# Patient Record
Sex: FEMALE | Race: WHITE | NOT HISPANIC OR LATINO | Employment: UNEMPLOYED | URBAN - METROPOLITAN AREA
[De-identification: names, ages, dates, MRNs, and addresses within clinical notes are randomized per-mention and may not be internally consistent; named-entity substitution may affect disease eponyms.]

---

## 2021-01-28 ENCOUNTER — OFFICE VISIT (OUTPATIENT)
Dept: INTERNAL MEDICINE | Facility: CLINIC | Age: 56
End: 2021-01-28

## 2021-01-28 VITALS
SYSTOLIC BLOOD PRESSURE: 121 MMHG | DIASTOLIC BLOOD PRESSURE: 73 MMHG | HEIGHT: 64 IN | BODY MASS INDEX: 28.24 KG/M2 | WEIGHT: 165.4 LBS | TEMPERATURE: 96.9 F | HEART RATE: 70 BPM

## 2021-01-28 DIAGNOSIS — Z86.73 HISTORY OF TIA (TRANSIENT ISCHEMIC ATTACK): ICD-10-CM

## 2021-01-28 DIAGNOSIS — L71.9 ROSACEA: ICD-10-CM

## 2021-01-28 DIAGNOSIS — N95.1 MENOPAUSAL SYMPTOMS: ICD-10-CM

## 2021-01-28 DIAGNOSIS — M19.079 PRIMARY OSTEOARTHRITIS OF FOOT, UNSPECIFIED LATERALITY: ICD-10-CM

## 2021-01-28 DIAGNOSIS — E03.9 ACQUIRED HYPOTHYROIDISM: Primary | ICD-10-CM

## 2021-01-28 DIAGNOSIS — Z13.21 ENCOUNTER FOR VITAMIN DEFICIENCY SCREENING: ICD-10-CM

## 2021-01-28 PROBLEM — J30.89 NON-SEASONAL ALLERGIC RHINITIS: Status: ACTIVE | Noted: 2021-01-28

## 2021-01-28 PROCEDURE — 99204 OFFICE O/P NEW MOD 45 MIN: CPT | Performed by: PHYSICIAN ASSISTANT

## 2021-01-28 RX ORDER — MELOXICAM 7.5 MG/1
7.5 TABLET ORAL AS NEEDED
COMMUNITY
Start: 2021-01-27

## 2021-01-28 RX ORDER — LEVOTHYROXINE SODIUM 0.07 MG/1
75 TABLET ORAL DAILY
COMMUNITY
Start: 2020-11-11

## 2021-01-28 RX ORDER — ASPIRIN 81 MG/1
81 TABLET ORAL DAILY
COMMUNITY

## 2021-01-28 RX ORDER — ATORVASTATIN CALCIUM 10 MG/1
10 TABLET, FILM COATED ORAL NIGHTLY
COMMUNITY

## 2021-01-28 RX ORDER — CETIRIZINE HYDROCHLORIDE 10 MG/1
10 TABLET ORAL DAILY
COMMUNITY

## 2021-01-28 NOTE — PROGRESS NOTES
"Patient Care Team:  Tamy Mayo PA-C as PCP - General (Physician Assistant)    Chief Complaint::   Chief Complaint   Patient presents with   • Annual Exam   • Establish Care        Subjective     HPI  55 year old female presents to establish care.  Born in [place], lived in [place] most of her  life.  Moved 2 years ago to [facility] for 's work with [employer].. 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  She is the oldest of 9 children, drives home to [place] regularly to help look after her 82-year-old mother.  Hypothyroidism, treated with levothyroxine 75mcg, last labs performed 6 months ago.  2017 had a TIA, following an exercise class.  TIA x  -[facility]. Workup included ECHO, heart monitor.  Unsure of the cause.  Was placed on atorvastatin 10 mg tablets and aspirin 81 mg.  Last physical in 2019, had repeat MRI of the brain. \"Something suspicious in occipital lobe\" but was told it was ok to wait.  MMG in fall, 2019 WNL, TVS 2017, uterine polyp. Still has regular menses.  This year was the first time she has skipped a period. She was having progressively heavier, cramping menses.  TVS in 2019, showed thickened endometrium. Saw gynecologist again in January 2020.  Did have a repeat transvaginal ultrasound.  Again, was told lining was thickened.  Currently think she is in menopause.  Last menstrual period prior to this month was in September.  Having hot flashes.   Taking meloxicam-injured left foot-Dr. Peralta.  Rosacea-metronidazole 0.75%.  See Molly Santos.  12/2019 colonoscopy.  She denies chest pain, shortness of breath, palpitations, or headaches.        The following portions of the patient's history were reviewed and updated as appropriate: active problem list, medication list, allergies, family history, social history    Review of Systems:   Review of Systems   Constitutional: Negative for activity change, " "appetite change, diaphoresis, fatigue, unexpected weight gain and unexpected weight loss.   HENT: Negative for congestion, ear discharge, ear pain, hearing loss and trouble swallowing.    Eyes: Negative for blurred vision, double vision and visual disturbance.   Respiratory: Negative for chest tightness and shortness of breath.    Cardiovascular: Negative for chest pain, palpitations and leg swelling.   Gastrointestinal: Negative for abdominal pain, blood in stool, GERD and indigestion.   Endocrine: Negative for cold intolerance and heat intolerance.   Genitourinary: Negative for amenorrhea, breast discharge, dysuria, hematuria, menstrual problem, pelvic pain, pelvic pressure and urgency.   Musculoskeletal: Negative for arthralgias and myalgias.   Skin: Negative for skin lesions.   Allergic/Immunologic: Positive for environmental allergies. Negative for food allergies and immunocompromised state.   Neurological: Negative for dizziness, tremors, seizures, syncope, facial asymmetry, speech difficulty, weakness, headache, memory problem and confusion.   Hematological: Negative for adenopathy. Does not bruise/bleed easily.   Psychiatric/Behavioral: Negative for sleep disturbance and depressed mood. The patient is not nervous/anxious.        Vital Signs  Vitals:    01/28/21 1436   BP: 121/73   BP Location: Right arm   Patient Position: Sitting   Cuff Size: Adult   Pulse: 70   Temp: 96.9 °F (36.1 °C)   TempSrc: Temporal   Weight: 75 kg (165 lb 6.4 oz)   Height: 162.6 cm (64\")   PainSc: 0-No pain     Body mass index is 28.39 kg/m².    Labs  No results found for any previous visit.       Imaging  No radiology results for the last 30 days.      Current Outpatient Medications:   •  aspirin 81 MG EC tablet, Take 81 mg by mouth Daily., Disp: , Rfl:   •  atorvastatin (LIPITOR) 10 MG tablet, Take 10 mg by mouth Every Night., Disp: , Rfl:   •  B Complex-C (VITAMIN B + C COMPLEX PO), Take  by mouth Daily., Disp: , Rfl:   •  " cetirizine (zyrTEC) 10 MG tablet, Take 10 mg by mouth Daily., Disp: , Rfl:   •  levothyroxine (SYNTHROID, LEVOTHROID) 75 MCG tablet, Take 75 mcg by mouth Daily., Disp: , Rfl:   •  meloxicam (MOBIC) 7.5 MG tablet, Take 7.5 mg by mouth As Needed., Disp: , Rfl:   •  metroNIDAZOLE (METROCREAM) 0.75 % cream, Apply 0.75 application topically to the appropriate area as directed 2 (Two) Times a Day As Needed., Disp: , Rfl:     Physical Exam:    Physical Exam  Vitals signs reviewed.   Constitutional:       Appearance: Normal appearance. She is well-developed.   HENT:      Head: Normocephalic and atraumatic.      Right Ear: Hearing, tympanic membrane, ear canal and external ear normal.      Left Ear: Hearing, tympanic membrane, ear canal and external ear normal.      Nose: Nose normal.      Mouth/Throat:      Pharynx: Uvula midline.   Eyes:      General: Lids are normal.      Conjunctiva/sclera: Conjunctivae normal.      Pupils: Pupils are equal, round, and reactive to light.   Neck:      Musculoskeletal: Full passive range of motion without pain, normal range of motion and neck supple.   Cardiovascular:      Rate and Rhythm: Normal rate and regular rhythm.      Heart sounds: Normal heart sounds.   Pulmonary:      Effort: Pulmonary effort is normal.      Breath sounds: Normal breath sounds.   Abdominal:      General: Bowel sounds are normal.      Palpations: Abdomen is soft.   Musculoskeletal: Normal range of motion.   Skin:     General: Skin is warm and dry.   Neurological:      Mental Status: She is alert and oriented to person, place, and time.      Deep Tendon Reflexes: Reflexes are normal and symmetric.   Psychiatric:         Speech: Speech normal.         Behavior: Behavior normal.         Thought Content: Thought content normal.         Judgment: Judgment normal.         Procedures        Assessment/Plan   Problem List Items Addressed This Visit        Endocrine and Metabolic    Acquired hypothyroidism - Primary     Overview     Continue levothyroxine 75 mcg tablet daily.         Relevant Medications    levothyroxine (SYNTHROID, LEVOTHROID) 75 MCG tablet    Other Relevant Orders    TSH    T4, Free    Encounter for vitamin deficiency screening    Relevant Orders    Vitamin B12    Vitamin D 25 Hydroxy       Genitourinary and Reproductive     Menopausal symptoms    Overview     Consider transvaginal ultrasound following physical.  FSH ordered.         Relevant Orders    TSH    T4, Free    Follicle Stimulating Hormone       Musculoskeletal and Injuries    Osteoarthritis of foot    Overview     Continue meloxicam 7.5 mg tablets daily.            Neuro    History of TIA (transient ischemic attack)    Overview     Request for patient's records from [facility].  Continue aspirin 81 milligrams tablets daily.  Continue atorvastatin 10 mg tablet daily.         Relevant Orders    CBC & Differential    Comprehensive Metabolic Panel    Lipid Panel       Skin    Rosacea    Overview     Continue topical metronidazole 0.75% cream as directed.         Relevant Medications    metroNIDAZOLE (METROCREAM) 0.75 % cream          Return in about 4 weeks (around 2/25/2021) for routine physical.    Plan of care reviewed with patient at the conclusion of today's visit. Education was provided regarding diagnosis, management, and any prescribed or recommended OTC medications.Patient verbalizes understanding of and agreement with management plan.       Tamy Mayo PA-C    Please note that portions of this note were completed with a voice recognition program. Efforts were made to edit the dictations, but occasionally words are mistranscribed.

## 2021-02-26 ENCOUNTER — LAB (OUTPATIENT)
Dept: LAB | Facility: HOSPITAL | Age: 56
End: 2021-02-26

## 2021-02-26 DIAGNOSIS — Z86.73 HISTORY OF TIA (TRANSIENT ISCHEMIC ATTACK): ICD-10-CM

## 2021-02-26 DIAGNOSIS — Z13.21 ENCOUNTER FOR VITAMIN DEFICIENCY SCREENING: ICD-10-CM

## 2021-02-26 DIAGNOSIS — N95.1 MENOPAUSAL SYMPTOMS: ICD-10-CM

## 2021-02-26 DIAGNOSIS — E03.9 ACQUIRED HYPOTHYROIDISM: ICD-10-CM

## 2021-02-26 LAB
25(OH)D3 SERPL-MCNC: 33.8 NG/ML
ALBUMIN SERPL-MCNC: 4.6 G/DL (ref 3.5–5.2)
ALBUMIN/GLOB SERPL: 1.6 G/DL
ALP SERPL-CCNC: 45 U/L (ref 39–117)
ALT SERPL W P-5'-P-CCNC: 14 U/L (ref 1–33)
ANION GAP SERPL CALCULATED.3IONS-SCNC: 5.4 MMOL/L (ref 5–15)
AST SERPL-CCNC: 21 U/L (ref 1–32)
BASOPHILS # BLD AUTO: 0.02 10*3/MM3 (ref 0–0.2)
BASOPHILS NFR BLD AUTO: 0.2 % (ref 0–1.5)
BILIRUB SERPL-MCNC: 0.5 MG/DL (ref 0–1.2)
BUN SERPL-MCNC: 13 MG/DL (ref 6–20)
BUN/CREAT SERPL: 18.3 (ref 7–25)
CALCIUM SPEC-SCNC: 9.5 MG/DL (ref 8.6–10.5)
CHLORIDE SERPL-SCNC: 101 MMOL/L (ref 98–107)
CHOLEST SERPL-MCNC: 176 MG/DL (ref 0–200)
CO2 SERPL-SCNC: 27.6 MMOL/L (ref 22–29)
CREAT SERPL-MCNC: 0.71 MG/DL (ref 0.57–1)
DEPRECATED RDW RBC AUTO: 40.2 FL (ref 37–54)
EOSINOPHIL # BLD AUTO: 0.04 10*3/MM3 (ref 0–0.4)
EOSINOPHIL NFR BLD AUTO: 0.5 % (ref 0.3–6.2)
ERYTHROCYTE [DISTWIDTH] IN BLOOD BY AUTOMATED COUNT: 12.1 % (ref 12.3–15.4)
FSH SERPL-ACNC: 7.45 MIU/ML
GFR SERPL CREATININE-BSD FRML MDRD: 86 ML/MIN/1.73
GLOBULIN UR ELPH-MCNC: 2.8 GM/DL
GLUCOSE SERPL-MCNC: 87 MG/DL (ref 65–99)
HCT VFR BLD AUTO: 49.2 % (ref 34–46.6)
HDLC SERPL-MCNC: 74 MG/DL (ref 40–60)
HGB BLD-MCNC: 16.6 G/DL (ref 12–15.9)
IMM GRANULOCYTES # BLD AUTO: 0.04 10*3/MM3 (ref 0–0.05)
IMM GRANULOCYTES NFR BLD AUTO: 0.5 % (ref 0–0.5)
LDLC SERPL CALC-MCNC: 95 MG/DL (ref 0–100)
LDLC/HDLC SERPL: 1.29 {RATIO}
LYMPHOCYTES # BLD AUTO: 2.45 10*3/MM3 (ref 0.7–3.1)
LYMPHOCYTES NFR BLD AUTO: 29 % (ref 19.6–45.3)
MCH RBC QN AUTO: 30.3 PG (ref 26.6–33)
MCHC RBC AUTO-ENTMCNC: 33.7 G/DL (ref 31.5–35.7)
MCV RBC AUTO: 89.9 FL (ref 79–97)
MONOCYTES # BLD AUTO: 0.56 10*3/MM3 (ref 0.1–0.9)
MONOCYTES NFR BLD AUTO: 6.6 % (ref 5–12)
NEUTROPHILS NFR BLD AUTO: 5.33 10*3/MM3 (ref 1.7–7)
NEUTROPHILS NFR BLD AUTO: 63.2 % (ref 42.7–76)
NRBC BLD AUTO-RTO: 0 /100 WBC (ref 0–0.2)
PLATELET # BLD AUTO: 252 10*3/MM3 (ref 140–450)
PMV BLD AUTO: 11.7 FL (ref 6–12)
POTASSIUM SERPL-SCNC: 4.3 MMOL/L (ref 3.5–5.2)
PROT SERPL-MCNC: 7.4 G/DL (ref 6–8.5)
RBC # BLD AUTO: 5.47 10*6/MM3 (ref 3.77–5.28)
SODIUM SERPL-SCNC: 134 MMOL/L (ref 136–145)
T4 FREE SERPL-MCNC: 1.25 NG/DL (ref 0.93–1.7)
TRIGL SERPL-MCNC: 33 MG/DL (ref 0–150)
TSH SERPL DL<=0.05 MIU/L-ACNC: 3.18 UIU/ML (ref 0.27–4.2)
VIT B12 BLD-MCNC: 445 PG/ML (ref 211–946)
VLDLC SERPL-MCNC: 7 MG/DL (ref 5–40)
WBC # BLD AUTO: 8.44 10*3/MM3 (ref 3.4–10.8)

## 2021-02-26 PROCEDURE — 84439 ASSAY OF FREE THYROXINE: CPT

## 2021-02-26 PROCEDURE — 80053 COMPREHEN METABOLIC PANEL: CPT

## 2021-02-26 PROCEDURE — 82306 VITAMIN D 25 HYDROXY: CPT

## 2021-02-26 PROCEDURE — 80061 LIPID PANEL: CPT

## 2021-02-26 PROCEDURE — 85025 COMPLETE CBC W/AUTO DIFF WBC: CPT

## 2021-02-26 PROCEDURE — 83001 ASSAY OF GONADOTROPIN (FSH): CPT

## 2021-02-26 PROCEDURE — 82607 VITAMIN B-12: CPT

## 2021-02-26 PROCEDURE — 84443 ASSAY THYROID STIM HORMONE: CPT

## 2021-03-05 ENCOUNTER — OFFICE VISIT (OUTPATIENT)
Dept: INTERNAL MEDICINE | Facility: CLINIC | Age: 56
End: 2021-03-05

## 2021-03-05 VITALS
BODY MASS INDEX: 28.41 KG/M2 | HEIGHT: 64 IN | TEMPERATURE: 97.3 F | SYSTOLIC BLOOD PRESSURE: 132 MMHG | WEIGHT: 166.4 LBS | HEART RATE: 72 BPM | DIASTOLIC BLOOD PRESSURE: 88 MMHG

## 2021-03-05 DIAGNOSIS — G47.30 SLEEP APNEA, UNSPECIFIED TYPE: ICD-10-CM

## 2021-03-05 DIAGNOSIS — E03.9 ACQUIRED HYPOTHYROIDISM: ICD-10-CM

## 2021-03-05 DIAGNOSIS — L71.9 ROSACEA: ICD-10-CM

## 2021-03-05 DIAGNOSIS — M19.079 PRIMARY OSTEOARTHRITIS OF FOOT, UNSPECIFIED LATERALITY: ICD-10-CM

## 2021-03-05 DIAGNOSIS — Z86.73 HISTORY OF TIA (TRANSIENT ISCHEMIC ATTACK): ICD-10-CM

## 2021-03-05 DIAGNOSIS — Z12.31 ENCOUNTER FOR SCREENING MAMMOGRAM FOR MALIGNANT NEOPLASM OF BREAST: ICD-10-CM

## 2021-03-05 DIAGNOSIS — Z00.00 ROUTINE MEDICAL EXAM: Primary | ICD-10-CM

## 2021-03-05 DIAGNOSIS — J30.89 NON-SEASONAL ALLERGIC RHINITIS, UNSPECIFIED TRIGGER: ICD-10-CM

## 2021-03-05 DIAGNOSIS — N92.6 IRREGULAR MENSES: ICD-10-CM

## 2021-03-05 DIAGNOSIS — Z13.21 ENCOUNTER FOR VITAMIN DEFICIENCY SCREENING: ICD-10-CM

## 2021-03-05 PROCEDURE — 93000 ELECTROCARDIOGRAM COMPLETE: CPT | Performed by: PHYSICIAN ASSISTANT

## 2021-03-05 PROCEDURE — 99396 PREV VISIT EST AGE 40-64: CPT | Performed by: PHYSICIAN ASSISTANT

## 2021-03-05 PROCEDURE — 99214 OFFICE O/P EST MOD 30 MIN: CPT | Performed by: PHYSICIAN ASSISTANT

## 2021-03-05 NOTE — PROGRESS NOTES
Patient Care Team:  Tamy Mayo PA-C as PCP - General (Physician Assistant)    Chief Complaint::   Chief Complaint   Patient presents with   • Annual Exam   • Hypothyroidism     f/u        Subjective     HPI  56 year old female with history of hypothyroidism, hyperlipidemia, allergic rhinitis, and history of TIA, presents for physical exam. I do not have her previous records for reference.   She was last seen on 1/28/21 as a new patient. Recent labs performed on 2/26/21 show vitamin D of 33.8, TSH of 3.180, and total cholesterol of 176, TRG 33, HDL 74, and LDL95.  She is compliant with her medications.  She had a recent history of irregular menses, skipping several months.  FSH was 7.45. She is currently on menses now. She denies pelvic pain or dyspareunia.   She is a regular walker.  She recently went to a podiatrist and had inserts which have helped.  She does get numbness and tingling in her toes after she walks for  >45 minutes. Regularly walks 5 miles.   Colonoscopy 12/2019, repeat in 5 years due to polyps. MMG current.   Spouse reports she snores. She reports disrupted sleep. Recent labs show RBC 5.47,HGB16.6, and HCT49.2. Weight has remained the same throughout the pandemic, slight increase,   Interested in sleep study.        The following portions of the patient's history were reviewed and updated as appropriate: active problem list, medication list, allergies, family history, social history    Review of Systems:   Review of Systems   Constitutional: Positive for fatigue. Negative for activity change, appetite change, chills, diaphoresis, fever, unexpected weight gain and unexpected weight loss.   HENT: Negative for congestion, hearing loss, rhinorrhea and sinus pressure.    Eyes: Negative for blurred vision, double vision and visual disturbance.   Respiratory: Negative for cough, chest tightness, shortness of breath and wheezing.    Cardiovascular: Negative for chest pain, palpitations and leg  "swelling.   Gastrointestinal: Negative for abdominal pain, blood in stool, constipation, diarrhea, GERD and indigestion.   Endocrine: Negative for cold intolerance and heat intolerance.   Genitourinary: Positive for menstrual problem. Negative for dysuria, hematuria, pelvic pain, pelvic pressure and urgency.   Musculoskeletal: Negative for arthralgias and myalgias.   Skin: Negative for skin lesions.   Allergic/Immunologic: Positive for environmental allergies. Negative for food allergies.   Neurological: Negative for dizziness, tremors, seizures, syncope, speech difficulty, weakness, headache, memory problem and confusion.   Hematological: Does not bruise/bleed easily.   Psychiatric/Behavioral: Positive for sleep disturbance. Negative for depressed mood. The patient is not nervous/anxious.        Vital Signs  Vitals:    03/05/21 1011 03/05/21 1109   BP: 142/96 132/88   BP Location: Left arm    Patient Position: Sitting    Cuff Size: Adult    Pulse: 72    Temp: 97.3 °F (36.3 °C)    TempSrc: Infrared    Weight: 75.5 kg (166 lb 6.4 oz)    Height: 162.6 cm (64\")    PainSc:   3    PainLoc: Neck      Body mass index is 28.56 kg/m².    Labs  Lab on 02/26/2021   Component Date Value Ref Range Status   • TSH 02/26/2021 3.180  0.270 - 4.200 uIU/mL Final   • Free T4 02/26/2021 1.25  0.93 - 1.70 ng/dL Final   • FSH 02/26/2021 7.45  mIU/mL Final   • Glucose 02/26/2021 87  65 - 99 mg/dL Final   • BUN 02/26/2021 13  6 - 20 mg/dL Final   • Creatinine 02/26/2021 0.71  0.57 - 1.00 mg/dL Final   • Sodium 02/26/2021 134* 136 - 145 mmol/L Final   • Potassium 02/26/2021 4.3  3.5 - 5.2 mmol/L Final   • Chloride 02/26/2021 101  98 - 107 mmol/L Final   • CO2 02/26/2021 27.6  22.0 - 29.0 mmol/L Final   • Calcium 02/26/2021 9.5  8.6 - 10.5 mg/dL Final   • Total Protein 02/26/2021 7.4  6.0 - 8.5 g/dL Final   • Albumin 02/26/2021 4.60  3.50 - 5.20 g/dL Final   • ALT (SGPT) 02/26/2021 14  1 - 33 U/L Final   • AST (SGOT) 02/26/2021 21  1 - 32 U/L " Final   • Alkaline Phosphatase 02/26/2021 45  39 - 117 U/L Final   • Total Bilirubin 02/26/2021 0.5  0.0 - 1.2 mg/dL Final   • eGFR Non  Amer 02/26/2021 86  >60 mL/min/1.73 Final   • Globulin 02/26/2021 2.8  gm/dL Final   • A/G Ratio 02/26/2021 1.6  g/dL Final   • BUN/Creatinine Ratio 02/26/2021 18.3  7.0 - 25.0 Final   • Anion Gap 02/26/2021 5.4  5.0 - 15.0 mmol/L Final   • Total Cholesterol 02/26/2021 176  0 - 200 mg/dL Final   • Triglycerides 02/26/2021 33  0 - 150 mg/dL Final   • HDL Cholesterol 02/26/2021 74* 40 - 60 mg/dL Final   • LDL Cholesterol  02/26/2021 95  0 - 100 mg/dL Final   • VLDL Cholesterol 02/26/2021 7  5 - 40 mg/dL Final   • LDL/HDL Ratio 02/26/2021 1.29   Final   • Vitamin B-12 02/26/2021 445  211 - 946 pg/mL Final   • 25 Hydroxy, Vitamin D 02/26/2021 33.8  ng/ml Final   • WBC 02/26/2021 8.44  3.40 - 10.80 10*3/mm3 Final   • RBC 02/26/2021 5.47* 3.77 - 5.28 10*6/mm3 Final   • Hemoglobin 02/26/2021 16.6* 12.0 - 15.9 g/dL Final   • Hematocrit 02/26/2021 49.2* 34.0 - 46.6 % Final   • MCV 02/26/2021 89.9  79.0 - 97.0 fL Final   • MCH 02/26/2021 30.3  26.6 - 33.0 pg Final   • MCHC 02/26/2021 33.7  31.5 - 35.7 g/dL Final   • RDW 02/26/2021 12.1* 12.3 - 15.4 % Final   • RDW-SD 02/26/2021 40.2  37.0 - 54.0 fl Final   • MPV 02/26/2021 11.7  6.0 - 12.0 fL Final   • Platelets 02/26/2021 252  140 - 450 10*3/mm3 Final   • Neutrophil % 02/26/2021 63.2  42.7 - 76.0 % Final   • Lymphocyte % 02/26/2021 29.0  19.6 - 45.3 % Final   • Monocyte % 02/26/2021 6.6  5.0 - 12.0 % Final   • Eosinophil % 02/26/2021 0.5  0.3 - 6.2 % Final   • Basophil % 02/26/2021 0.2  0.0 - 1.5 % Final   • Immature Grans % 02/26/2021 0.5  0.0 - 0.5 % Final   • Neutrophils, Absolute 02/26/2021 5.33  1.70 - 7.00 10*3/mm3 Final   • Lymphocytes, Absolute 02/26/2021 2.45  0.70 - 3.10 10*3/mm3 Final   • Monocytes, Absolute 02/26/2021 0.56  0.10 - 0.90 10*3/mm3 Final   • Eosinophils, Absolute 02/26/2021 0.04  0.00 - 0.40 10*3/mm3 Final    • Basophils, Absolute 02/26/2021 0.02  0.00 - 0.20 10*3/mm3 Final   • Immature Grans, Absolute 02/26/2021 0.04  0.00 - 0.05 10*3/mm3 Final   • nRBC 02/26/2021 0.0  0.0 - 0.2 /100 WBC Final       Imaging  No radiology results for the last 30 days.       Current Outpatient Medications:   •  aspirin 81 MG EC tablet, Take 81 mg by mouth Daily., Disp: , Rfl:   •  atorvastatin (LIPITOR) 10 MG tablet, Take 10 mg by mouth Every Night., Disp: , Rfl:   •  B Complex-C (VITAMIN B + C COMPLEX PO), Take  by mouth Daily., Disp: , Rfl:   •  CALCIUM ASCORBATE PO, Take  by mouth., Disp: , Rfl:   •  cetirizine (zyrTEC) 10 MG tablet, Take 10 mg by mouth Daily., Disp: , Rfl:   •  levothyroxine (SYNTHROID, LEVOTHROID) 75 MCG tablet, Take 75 mcg by mouth Daily., Disp: , Rfl:   •  meloxicam (MOBIC) 7.5 MG tablet, Take 7.5 mg by mouth As Needed., Disp: , Rfl:   •  metroNIDAZOLE (METROCREAM) 0.75 % cream, Apply 0.75 application topically to the appropriate area as directed 2 (Two) Times a Day As Needed., Disp: , Rfl:   •  Probiotic Product (PROBIOTIC-10 PO), Take  by mouth., Disp: , Rfl:     Physical Exam:    Physical Exam  Constitutional:       Appearance: She is normal weight.   HENT:      Head: Normocephalic and atraumatic.      Right Ear: Tympanic membrane, ear canal and external ear normal.      Left Ear: Tympanic membrane, ear canal and external ear normal.      Nose: Nose normal.   Eyes:      Extraocular Movements: Extraocular movements intact.      Conjunctiva/sclera: Conjunctivae normal.      Pupils: Pupils are equal, round, and reactive to light.   Cardiovascular:      Rate and Rhythm: Normal rate and regular rhythm.      Pulses: Normal pulses.      Heart sounds: Normal heart sounds.   Pulmonary:      Effort: Pulmonary effort is normal.      Breath sounds: Normal breath sounds.   Abdominal:      General: Abdomen is flat. Bowel sounds are normal.      Palpations: Abdomen is soft.      Tenderness: There is no abdominal tenderness.    Musculoskeletal:         General: Normal range of motion.      Cervical back: Normal range of motion and neck supple. No tenderness.   Skin:     General: Skin is warm and dry.   Neurological:      General: No focal deficit present.      Mental Status: She is alert and oriented to person, place, and time. Mental status is at baseline.   Psychiatric:         Mood and Affect: Mood normal.         Behavior: Behavior normal.         Judgment: Judgment normal.           ECG 12 Lead    Date/Time: 3/7/2021 8:12 AM  Performed by: Tamy Mayo PA-C  Authorized by: Tamy Mayo PA-C   Comparison: not compared with previous ECG   Previous ECG: no previous ECG available  Rhythm: sinus bradycardia  Rate: bradycardic  BPM: 57    Clinical impression: normal ECG                Assessment/Plan   Problem List Items Addressed This Visit        Allergies and Adverse Reactions    Non-seasonal allergic rhinitis    Overview     New onset since moving to [place].  Continues to take cetirizine 10 mg tablets daily.         Relevant Medications    meloxicam (MOBIC) 7.5 MG tablet       Endocrine and Metabolic    Acquired hypothyroidism    Overview     TSH 2/26/21 3.180.  Continue levothyroxine 75 mcg tablet daily.         Relevant Medications    levothyroxine (SYNTHROID, LEVOTHROID) 75 MCG tablet    Encounter for vitamin deficiency screening    Overview     Vitamin D at 33.8. Continue vitamin D 4000IU daily.         BMI 28.0-28.9,adult    Overview     Body mass index is 28.56 kg/m². Discussed importance of weight loss.  Recommend low fat, low calorie diet.  Recommend daily cardiovascular exercise. Goal is to lose 5 lbs in one month.               Genitourinary and Reproductive     Irregular menses    Overview     FSH 7.45. Pt is currently on menses.  We will continue to monitor for now.         Encounter for screening mammogram for malignant neoplasm of breast    Relevant Orders    Mammo Screening Digital Tomosynthesis  Bilateral With CAD       Health Encounters    Routine medical exam - Primary    Overview     EKG reviewed with patient. Bradycardia.  Labs 2/26/21 reviewed with patient.  Continue current medications for now.  Request for medical records.  Continue healthy diet, regular cardiovascular exercise.  Follow up in 3 months.              Musculoskeletal and Injuries    Osteoarthritis of foot    Overview     Sees podiatry.  Currently fitted for inserts, which help.  Continue meloxicam 7.5 mg tablets daily.            Neuro    History of TIA (transient ischemic attack)    Overview     Request for patient's records from [facility].  Continue aspirin 81 milligrams tablets daily.  Continue atorvastatin 10 mg tablet daily.            Skin    Rosacea    Overview     Continue topical metronidazole 0.75% cream as directed.         Relevant Medications    metroNIDAZOLE (METROCREAM) 0.75 % cream       Sleep    Sleep apnea    Overview     Suspected.  Will pursue sleep study.  RBC 5.47  HGB 16.6  HCT49.2         Relevant Orders    Ambulatory Referral to Sleep Medicine          Return in about 3 months (around 6/5/2021) for Recheck.    Plan of care reviewed with patient at the conclusion of today's visit. Education was provided regarding diagnosis, management, and any prescribed or recommended OTC medications.Patient verbalizes understanding of and agreement with management plan.       Tamy Mayo PA-C    Please note that portions of this note were completed with a voice recognition program. Efforts were made to edit the dictations, but occasionally words are mistranscribed.

## 2021-03-07 ENCOUNTER — TELEPHONE (OUTPATIENT)
Dept: INTERNAL MEDICINE | Facility: CLINIC | Age: 56
End: 2021-03-07

## 2021-03-07 PROBLEM — G47.30 SLEEP APNEA: Status: ACTIVE | Noted: 2021-03-07

## 2021-03-07 PROBLEM — N92.6 IRREGULAR MENSES: Status: ACTIVE | Noted: 2021-01-28

## 2021-03-07 PROBLEM — Z00.00 ROUTINE MEDICAL EXAM: Status: ACTIVE | Noted: 2021-03-07

## 2021-03-07 PROBLEM — Z12.31 ENCOUNTER FOR SCREENING MAMMOGRAM FOR MALIGNANT NEOPLASM OF BREAST: Status: ACTIVE | Noted: 2021-03-07

## 2021-03-07 NOTE — PATIENT INSTRUCTIONS
"BMI for Adults  What is BMI?  Body mass index (BMI) is a number that is calculated from a person's weight and height. BMI can help estimate how much of a person's weight is composed of fat. BMI does not measure body fat directly. Rather, it is an alternative to procedures that directly measure body fat, which can be difficult and expensive.  BMI can help identify people who may be at higher risk for certain medical problems.  What are BMI measurements used for?  BMI is used as a screening tool to identify possible weight problems. It helps determine whether a person is obese, overweight, a healthy weight, or underweight.  BMI is useful for:  · Identifying a weight problem that may be related to a medical condition or may increase the risk for medical problems.  · Promoting changes, such as changes in diet and exercise, to help reach a healthy weight. BMI screening can be repeated to see if these changes are working.  How is BMI calculated?  BMI involves measuring your weight in relation to your height. Both height and weight are measured, and the BMI is calculated from those numbers. This can be done either in English (U.S[place] or metric measurements. Note that charts and online BMI calculators are available to help you find your BMI quickly and easily without having to do these calculations yourself.  To calculate your BMI in English (U.S[place] measurements:    1. Measure your weight in pounds (lb).  2. Multiply the number of pounds by 703.  ? For example, for a person who weighs 180 lb, multiply that number by 703, which equals 126,540.  3. Measure your height in inches. Then multiply that number by itself to get a measurement called \"inches squared.\"  ? For example, for a person who is 70 inches tall, the \"inches squared\" measurement is 70 inches x 70 inches, which equals 4,900 inches squared.  4. Divide the total from step 2 (number of lb x 703) by the total from step 3 (inches squared): 126,540 ÷ 4,900 = 25.8. This is " "your BMI.  To calculate your BMI in metric measurements:  1. Measure your weight in kilograms (kg).  2. Measure your height in meters (m). Then multiply that number by itself to get a measurement called \"meters squared.\"  ? For example, for a person who is 1.75 m tall, the \"meters squared\" measurement is 1.75 m x 1.75 m, which is equal to 3.1 meters squared.  3. Divide the number of kilograms (your weight) by the meters squared number. In this example: 70 ÷ 3.1 = 22.6. This is your BMI.  What do the results mean?  BMI charts are used to identify whether you are underweight, normal weight, overweight, or obese. The following guidelines will be used:  · Underweight: BMI less than 18.5.  · Normal weight: BMI between 18.5 and 24.9.  · Overweight: BMI between 25 and 29.9.  · Obese: BMI of 30 or above.  Keep these notes in mind:  · Weight includes both fat and muscle, so someone with a muscular build, such as an athlete, may have a BMI that is higher than 24.9. In cases like these, BMI is not an accurate measure of body fat.  · To determine if excess body fat is the cause of a BMI of 25 or higher, further assessments may need to be done by a health care provider.  · BMI is usually interpreted in the same way for men and women.  Where to find more information  For more information about BMI, including tools to quickly calculate your BMI, go to these websites:  · Centers for Disease Control and Prevention: www.cdc.gov  · American Heart Association: www.heart.org  · National Heart, Lung, and Blood Joshua: www.nhlbi.nih.gov  Summary  · Body mass index (BMI) is a number that is calculated from a person's weight and height.  · BMI may help estimate how much of a person's weight is composed of fat. BMI can help identify those who may be at higher risk for certain medical problems.  · BMI can be measured using English measurements or metric measurements.  · BMI charts are used to identify whether you are underweight, normal " weight, overweight, or obese.  This information is not intended to replace advice given to you by your health care provider. Make sure you discuss any questions you have with your health care provider.  Document Revised: 09/09/2020 Document Reviewed: 07/17/2020  Elsevier Patient Education © 2020 [employer]

## 2021-03-12 ENCOUNTER — APPOINTMENT (OUTPATIENT)
Dept: MAMMOGRAPHY | Facility: HOSPITAL | Age: 56
End: 2021-03-12

## 2021-04-22 ENCOUNTER — APPOINTMENT (OUTPATIENT)
Dept: MAMMOGRAPHY | Facility: HOSPITAL | Age: 56
End: 2021-04-22

## 2021-06-03 ENCOUNTER — TELEPHONE (OUTPATIENT)
Dept: INTERNAL MEDICINE | Facility: CLINIC | Age: 56
End: 2021-06-03

## 2021-06-03 NOTE — TELEPHONE ENCOUNTER
PATIENT CALLED AND WANTED TO CONFIRM THAT MEDICAL RECORDS FROM PREVIOUS PCP HAVE BEEN RECEIVED BY OFFICE.     PLEASE CALL AND ADVISE: [phone]

## 2021-06-03 NOTE — TELEPHONE ENCOUNTER
CALLED PATIENT AND TOLD HER THAT WE HAVE NOT RECEIVED HER ENTIRE MEDICAL RECORDS THAT SHE REQUESTED FROM January WHEN SHE WAS FIRST SEEN BY KRISTINA.  PATIENT WAS MAINLY INTERESTED OF THE MRI OF THE BRAIN AND MAMMOGRAM.  PATIENT STATED SHE WILL CALL HER FORMER PCP TO SEE ABOUT GETTING HER RECORDS BEFORE HER NEXT APPOINTMENT WITH KRISTINA DURING THE LATER PART OF June.

## 2022-01-14 ENCOUNTER — OFFICE VISIT (OUTPATIENT)
Dept: SLEEP MEDICINE | Facility: CLINIC | Age: 57
End: 2022-01-14

## 2022-01-14 ENCOUNTER — PATIENT ROUNDING (BHMG ONLY) (OUTPATIENT)
Dept: SLEEP MEDICINE | Facility: HOSPITAL | Age: 57
End: 2022-01-14

## 2022-01-14 VITALS
OXYGEN SATURATION: 99 % | WEIGHT: 160.4 LBS | HEIGHT: 64 IN | SYSTOLIC BLOOD PRESSURE: 147 MMHG | DIASTOLIC BLOOD PRESSURE: 73 MMHG | HEART RATE: 65 BPM | BODY MASS INDEX: 27.39 KG/M2

## 2022-01-14 DIAGNOSIS — J30.9 ALLERGIC RHINITIS, UNSPECIFIED SEASONALITY, UNSPECIFIED TRIGGER: ICD-10-CM

## 2022-01-14 DIAGNOSIS — R06.83 SNORING: Primary | ICD-10-CM

## 2022-01-14 DIAGNOSIS — G47.33 OBSTRUCTIVE SLEEP APNEA, ADULT: ICD-10-CM

## 2022-01-14 PROCEDURE — 99203 OFFICE O/P NEW LOW 30 MIN: CPT | Performed by: INTERNAL MEDICINE

## 2022-01-14 RX ORDER — MULTIPLE VITAMINS W/ MINERALS TAB 9MG-400MCG
1 TAB ORAL DAILY
COMMUNITY

## 2022-01-14 NOTE — PROGRESS NOTES
January 14, 2022    Hello, may I speak with Evy Butler?    My name is MARKUS    I am  with [facility]  [address]  [facility] [address] [phone].    Before we get started may I verify your date of birth? 1965    I am calling to officially welcome you to our practice and ask about your recent visit. Is this a good time to talk? YES    Tell me about your visit with us. What things went well? FINE       We're always looking for ways to make our patients' experiences even better. Do you have recommendations on ways we may improve? NO    Overall were you satisfied with your first visit to our practice?YES     I appreciate you taking the time to speak with me today. Is there anything else I can do for you? NO      Thank you, and have a great day.

## 2022-01-31 ENCOUNTER — HOSPITAL ENCOUNTER (OUTPATIENT)
Dept: SLEEP MEDICINE | Facility: HOSPITAL | Age: 57
Discharge: HOME | End: 2022-01-31
Admitting: INTERNAL MEDICINE

## 2022-01-31 DIAGNOSIS — G47.33 OBSTRUCTIVE SLEEP APNEA, ADULT: ICD-10-CM

## 2022-01-31 DIAGNOSIS — R06.83 SNORING: ICD-10-CM

## 2022-01-31 PROCEDURE — 95806 SLEEP STUDY UNATT&RESP EFFT: CPT

## 2022-01-31 PROCEDURE — 95806 SLEEP STUDY UNATT&RESP EFFT: CPT | Performed by: INTERNAL MEDICINE

## 2022-02-02 NOTE — PROGRESS NOTES
Chief Complaint  Snoring and nonrestorative sleep.    Subjective         Evy Butler presents to [facility] SLEEP [facility]ICINE for the evaluation of snoring and nonrestorative sleep.  Her primary care provider is Tamy Mayo PA-C.  She is seen in person in sleep clinic.  History of Present Illness  She says she has had snoring noted for at least 5 years.  She has had some apneas noted recently.  She says her sister has history of obstructive sleep apnea.  Patient had a history of a TIA after having a prep for colonoscopy.  She has a history of seasonal allergies.  She feels very tired during the day.  She thinks she is gained about 15 pounds in the past year.  She denies awakening gasping for breath.  She is sometimes rested although occasionally night.  She has a morning headache 2 days/week.    She has been told she snores worse when she is on her back.  She will awaken with a dry mouth.  She denies breaking her nose.  She has a history of congestion allergies.  She occasionally has left facial numbness.    She goes to bed between 9 PM and 11 PM.  She will fall asleep quickly.  She awakens 0-1 times during the night.  She gets about 8 hours of sleep and is occasionally rested.  She denies any history of hypertension, diabetes, or coronary artery disease.  She has a history of thyroid problems and had a history of TIA.    Allergies: She has seasonal environmental allergies    Habits: Smoking: She smoked a pack per day for 10 years quit 1995    Alcohol: She has 3 drinks 2-3 times per week    Caffeine: She has 2 to 3 cups of coffee and 2-3 servings of decaf tea per day    Medical illnesses: She has a history of thyroid problems and a TIA    Medications: Levothyroxine, atorvastatin, multivitamins, vitamin D3 vitamin K2 aspirin, calcium, Zyrtec.    Surgeries: She had wisdom teeth extraction dental implants cyst, uterine cervical surgery    Family history: Positives include diabetes, hypertension,  "stroke, sleep apnea, thyroid disease, cancer, parkinsonism, degenerative joint disease, adrenal problems    Review of systems: Positives include fatigue, congestion, dental problems, apneas, chest tightness, constipation, nausea, cold intolerance, heat intolerance, environmental allergies, headaches, numbness, speech difficulty, decreased concentration, nervousness and anxious.  Also apnea, chest tightness, neck stiffness.  Other systems reviewed and negative.    [place] score is 8/24  Objective   Vital Signs:   /73 (BP Location: Left arm, Patient Position: Sitting, Cuff Size: Adult)   Pulse 65   Ht 162.6 cm (64\")   Wt 72.8 kg (160 lb 6.4 oz)   SpO2 99%   BMI 27.53 kg/m²     Physical Exam patient appears to be awake and alert.  She does not appear to be in acute respiratory distress.    She is normocephalic.  She has Mallampati class II anatomy.    Lungs are clear.    Cardiac exam feel normal S1-S2.    Extremities showed no edema.  Result Review :         Assessment and Plan   Diagnoses and all orders for this visit:    1. Snoring (Primary)  -     Home Sleep Study; Future    2. Obstructive sleep apnea, adult  -     Home Sleep Study; Future    3. Allergic rhinitis, unspecified seasonality, unspecified trigger    Patient has a history of snoring and observed apneas.  She gives an excellent story for obstructive sleep apnea.  We will plan to proceed to home sleep testing.  We have discussed potential therapies including CPAP, weight control, oral appliance, and surgery.  We have discussed the long-term consequences of untreated obstructive sleep apnea.  These include hypertension, diabetes, heart disease, stroke, and dementia.  She is encouraged to achieve ideal body weight.  She is encouraged to avoid alcohol and sedatives close to bedtime.  She is encouraged to practice lateral position sleep.  We will see her back after her study.  I spent 35 minutes caring for Evy on this date of service. This time " includes time spent by me in the following activities:obtaining and/or reviewing a separately obtained history, performing a medically appropriate examination and/or evaluation , counseling and educating the patient/family/caregiver, ordering medications, tests, or procedures and documenting information in the medical record  Follow Up   Return for Follow up after study, Next scheduled follow-up.  Patient was given instructions and counseling regarding her condition or for health maintenance advice. Please see specific information pulled into the AVS if appropriate.   Gaston Calhoun MD [facility]  Sleep Medicine  Pulmonary and Critical Care Medicine

## 2022-02-18 ENCOUNTER — OFFICE VISIT (OUTPATIENT)
Dept: SLEEP MEDICINE | Facility: CLINIC | Age: 57
End: 2022-02-18

## 2022-02-18 VITALS
DIASTOLIC BLOOD PRESSURE: 65 MMHG | OXYGEN SATURATION: 100 % | WEIGHT: 158.2 LBS | HEART RATE: 75 BPM | HEIGHT: 64 IN | SYSTOLIC BLOOD PRESSURE: 131 MMHG | BODY MASS INDEX: 27.01 KG/M2

## 2022-02-18 DIAGNOSIS — E66.3 OVERWEIGHT: ICD-10-CM

## 2022-02-18 DIAGNOSIS — R06.83 SNORING: Primary | ICD-10-CM

## 2022-02-18 PROBLEM — G47.30 SLEEP APNEA: Status: RESOLVED | Noted: 2021-03-07 | Resolved: 2022-02-18

## 2022-02-18 PROCEDURE — 99213 OFFICE O/P EST LOW 20 MIN: CPT | Performed by: INTERNAL MEDICINE

## 2022-03-08 NOTE — PROGRESS NOTES
"Chief Complaint  Snoring and nonrestorative sleep    Subjective         Evy Butler presents to [facility] SLEEP [facility]IC[facility] for the evaluation of snoring nonrestorative sleep.  Her primary care provider is Tamy Mayo PA-C she is seen in person in the sleep clinic.  History of Present Illness  Patient was last seen in clinic January 14.  She had snoring and nonrestorative sleep.  She does give TIA, thyroid problems, and is overweight.  She says she is sleeping better.  She is avoiding alcohol and trying to sleep on her side she occasionally does admit to anxiety she is had on the night of her sleep study she went to bed earlier than usual.    Review of systems: Positives include fatigue, congestion, dental problems, apneas, chest tightness, constipation, nausea, cold intolerance, heat intolerance, environmental allergies, headaches, numbness, speech difficulty, decreased concentration, nervousness and anxious.  Also apnea, chest tightness, neck stiffness.  Other systems reviewed and negative.      [place] score is 3/24  Objective   Vital Signs:   /65 (BP Location: Left arm, Patient Position: Sitting, Cuff Size: Adult)   Pulse 75   Ht 162.6 cm (64\")   Wt 71.8 kg (158 lb 3.2 oz)   SpO2 100%   BMI 27.15 kg/m²     Physical Exam patient appears to be awake and alert.  She does not appear to be in acute respiratory distress.  Lungs are clear.    Cardiac exam feel normal S1-S2.  Back extremities showed no edema.  Result Review :    Home sleep testing on January 31 showed AHI of 1.3 which is within normal range.  In the supine position her index was only 3.4.  Her average heart rate 55.     Assessment and Plan   Diagnoses and all orders for this visit:    1. Snoring (Primary)    2. Overweight    Patient does appear to have some snoring but does not have significant sleep disordered breathing.  She is to try to achieve ideal body weight.  Has not encouraged avoid alcohol and sedatives close " to bedtime.  She is encouraged to practice lateral position sleep.  She is to follow-up with Ms. Mayo.  We be happy to see her if situation changes.  She is to practice excellent sleep hygiene.  I spent 25 minutes caring for Evy on this date of service. This time includes time spent by me in the following activities:reviewing tests, obtaining and/or reviewing a separately obtained history, performing a medically appropriate examination and/or evaluation , counseling and educating the patient/family/caregiver, ordering medications, tests, or procedures and documenting information in the medical record  Follow Up   Return if symptoms worsen or fail to improve.  Patient was given instructions and counseling regarding her condition or for health maintenance advice. Please see specific information pulled into the AVS if appropriate.     Gaston Calhoun MD [facility]  Sleep Medicine  Pulmonary and Critical Care Medicine

## 2022-03-28 ENCOUNTER — APPOINTMENT (OUTPATIENT)
Dept: SLEEP MEDICINE | Facility: HOSPITAL | Age: 57
End: 2022-03-28